# Patient Record
Sex: MALE | Race: WHITE
[De-identification: names, ages, dates, MRNs, and addresses within clinical notes are randomized per-mention and may not be internally consistent; named-entity substitution may affect disease eponyms.]

---

## 2018-02-15 ENCOUNTER — HOSPITAL ENCOUNTER (OUTPATIENT)
Dept: HOSPITAL 35 - PAIN | Age: 48
End: 2018-02-15
Attending: ANESTHESIOLOGY
Payer: COMMERCIAL

## 2018-02-15 VITALS — BODY MASS INDEX: 28.7 KG/M2 | WEIGHT: 205 LBS | HEIGHT: 70.98 IN

## 2018-02-15 VITALS — SYSTOLIC BLOOD PRESSURE: 160 MMHG | DIASTOLIC BLOOD PRESSURE: 100 MMHG

## 2018-02-15 DIAGNOSIS — F39: ICD-10-CM

## 2018-02-15 DIAGNOSIS — G89.29: Primary | ICD-10-CM

## 2018-02-15 DIAGNOSIS — Z79.891: ICD-10-CM

## 2018-02-15 DIAGNOSIS — R07.89: ICD-10-CM

## 2018-05-07 ENCOUNTER — HOSPITAL ENCOUNTER (OUTPATIENT)
Dept: HOSPITAL 35 - PAIN | Age: 48
End: 2018-05-07
Attending: ANESTHESIOLOGY
Payer: COMMERCIAL

## 2018-05-07 VITALS — SYSTOLIC BLOOD PRESSURE: 152 MMHG | DIASTOLIC BLOOD PRESSURE: 94 MMHG

## 2018-05-07 VITALS — WEIGHT: 207.2 LBS | HEIGHT: 70.98 IN | BODY MASS INDEX: 29.01 KG/M2

## 2018-05-07 DIAGNOSIS — F11.90: ICD-10-CM

## 2018-05-07 DIAGNOSIS — G89.22: ICD-10-CM

## 2018-05-07 DIAGNOSIS — R07.89: Primary | ICD-10-CM

## 2018-07-23 ENCOUNTER — HOSPITAL ENCOUNTER (OUTPATIENT)
Dept: HOSPITAL 35 - PAIN | Age: 48
End: 2018-07-23
Attending: ANESTHESIOLOGY
Payer: COMMERCIAL

## 2018-07-23 VITALS — DIASTOLIC BLOOD PRESSURE: 96 MMHG | SYSTOLIC BLOOD PRESSURE: 158 MMHG

## 2018-07-23 VITALS — WEIGHT: 210 LBS | BODY MASS INDEX: 29.4 KG/M2 | HEIGHT: 70.98 IN

## 2018-07-23 DIAGNOSIS — G89.4: ICD-10-CM

## 2018-07-23 DIAGNOSIS — F32.9: ICD-10-CM

## 2018-07-23 DIAGNOSIS — Z79.891: ICD-10-CM

## 2018-07-23 DIAGNOSIS — R07.9: ICD-10-CM

## 2018-07-23 DIAGNOSIS — F41.9: ICD-10-CM

## 2018-07-23 DIAGNOSIS — G47.00: Primary | ICD-10-CM

## 2018-10-15 ENCOUNTER — HOSPITAL ENCOUNTER (OUTPATIENT)
Dept: HOSPITAL 35 - PAIN | Age: 48
End: 2018-10-15
Attending: ANESTHESIOLOGY
Payer: COMMERCIAL

## 2018-10-15 VITALS — DIASTOLIC BLOOD PRESSURE: 88 MMHG | SYSTOLIC BLOOD PRESSURE: 136 MMHG

## 2018-10-15 VITALS — BODY MASS INDEX: 29.26 KG/M2 | WEIGHT: 209 LBS | HEIGHT: 70.98 IN

## 2018-10-15 DIAGNOSIS — M17.11: ICD-10-CM

## 2018-10-15 DIAGNOSIS — F32.9: ICD-10-CM

## 2018-10-15 DIAGNOSIS — F41.9: ICD-10-CM

## 2018-10-15 DIAGNOSIS — G89.29: ICD-10-CM

## 2018-10-15 DIAGNOSIS — M54.5: Primary | ICD-10-CM

## 2018-10-15 DIAGNOSIS — M96.1: ICD-10-CM

## 2018-10-15 DIAGNOSIS — R27.0: ICD-10-CM

## 2018-10-15 DIAGNOSIS — E83.119: ICD-10-CM

## 2018-10-15 DIAGNOSIS — Z79.899: ICD-10-CM

## 2019-01-07 ENCOUNTER — HOSPITAL ENCOUNTER (OUTPATIENT)
Dept: HOSPITAL 35 - PAIN | Age: 49
End: 2019-01-07
Attending: CLINICAL NURSE SPECIALIST
Payer: COMMERCIAL

## 2019-01-07 VITALS — DIASTOLIC BLOOD PRESSURE: 109 MMHG | SYSTOLIC BLOOD PRESSURE: 157 MMHG

## 2019-01-07 VITALS — HEIGHT: 70.98 IN | BODY MASS INDEX: 29.71 KG/M2 | WEIGHT: 212.2 LBS

## 2019-01-07 DIAGNOSIS — M96.1: ICD-10-CM

## 2019-01-07 DIAGNOSIS — M19.90: ICD-10-CM

## 2019-01-07 DIAGNOSIS — F32.9: ICD-10-CM

## 2019-01-07 DIAGNOSIS — Z79.899: ICD-10-CM

## 2019-01-07 DIAGNOSIS — F41.9: ICD-10-CM

## 2019-01-07 DIAGNOSIS — G89.29: Primary | ICD-10-CM

## 2019-01-07 DIAGNOSIS — E83.119: ICD-10-CM

## 2019-01-07 NOTE — NUR
Pain Clinic Assessment:
 
1. History of Osteoarthritis:
Not Applicable
   History of Rheumatoid Arthritis:
Not Applicable
 
2. Height: 5 ft. 11 in. 180.3 cm.
   Weight: 212.2 lb.  oz. 96.253 kg.
   Patient's BMI: 29.6
 
3. Vital Signs:
   BP: 157/109 Pulse: 103 Resp: 18
   Temp:  02 Sat: 100 ECG Mon:
 
4. Pain Intensity: 4
 
5. Fall Risk:
   Dizziness: N  Needs help standing or walking: N
   Fallen in the last 3 months: N
   Fall risk comments:
 
 
6. Patient on Blood Thinner: None
 
7. History of Hypertension: N
 
8. Opioid Therapy greater than 6 weeks: Y
   Opiate Contract Signed: 08/04/16
 
9. Risk Assessment Tool Provided: LOW RISK 0/3
 
10. Functional Assessment Tool: 19/70
 
11. Recreational Drug Use: Never Drug Type:
    Tobacco Use: Never Smoker Tobacco Type:
       Amount or Packs/day:  How Many Years:
    Alcohol Use: Yes  Frequency: Special Occasions Quant:

## 2019-01-08 NOTE — HPC
[facility]
[address]
[place]   [address]                     PAIN MANAGEMENT CONSULTATION  
_______________________________________________________________________________
 
Name:       DAVID MARTINEZIAN               [facility] #:                     [facility]      [id number]                       Account #:      [id number]  
Admission:  01/07/19    Attend Phys:    Wanda Angel     
Discharge:              Date of Birth:  [birth year]  
                                                          Report #: [id number]
                                                                    [id number]   
_______________________________________________________________________________
THIS REPORT FOR:   //name//                          
 
CC: Wanda Angel
    [facility] physician/PCP
 
DATE OF SERVICE:  01/07/2019
 
 
CHIEF COMPLAINT:  Chronic pain, cervicalgia, osteoarthritis, lower back pain and
right chest wall pain.
 
HISTORY OF CHIEF COMPLAINT:  The patient returns to the pain clinic today for
refill of his medication.  He is a longstanding patient that requires opioids on
a daily basis.  He tells me that his pain score is 4/10 today, which is an
average, but occasionally, it will be 7-8/10.  He denies any constipation.  He
tells me that he does take fruits and vegetables, especially strawberries and
drinks plenty of water every day.  He denies any daytime somnolence.  He says
that he recently had an implant of his tooth that has been ongoing since
November and he has been having some ringing in his ear.  He is to have the
final tooth placed in February.  He tells me that it is slowly getting better. 
Unsure if it is from his implant or if it is from an accumulation of wax, but he
has a history of accumulating in his ears.  The patient tells me he will try to
deal with that after his implant in February completed.  The patient tells me
that the medicines that he is prescribed is Exalgo, it helps his right rib area
and his lower back pain and he does have pain, worse with activity.  He has
recently tried to return to golf and tells me that his chest felt some pulling
and has had increased pain in that area since playing golf, but his goal for the
year is to play 18 rounds of golf frequently, try to work through his pain and
get back to things that he enjoys in life.  The patient would like a refill of
his medications today.
 
ALLERGIES:  PENICILLIN.
 
MEDICATIONS:  Current list of medication is hydromorphone, Exalgo 12 mg every
day.
 
PQRS:
1.  He has diffuse generalized osteoarthritis involving multiple small and large
joints.  Denies rheumatoid arthritis.
2.  Height is 5 feet 11 inches, weight is 212, BMI is 29.6.
3.  Blood pressure 157/109, pulse is 103, respirations 18, oxygen sat is 100%.
4.  Pain score is 4/10.
5.  Fall risk.  Denies dizziness, does not need help walking or standing.  Has
not fallen in the last 3 months.
6.  The patient is not on any blood thinners and not on any hypertension
medicines.
 
 
 
[address]   [address]                     PAIN MANAGEMENT CONSULTATION  
_______________________________________________________________________________
 
Name:       DAVID MARTINEZ               Room #:                     REG [facility]#:      [id number]                       Account #:      [id number]  
Admission:  01/07/19    Attend Phys:    Wanda nAgel     
Discharge:              Date of Birth:  [birth year]  
                                                          Report #: [id number]
                                                                    [id number]   
_______________________________________________________________________________
7.  Opioid therapy is greater than 6 weeks, therefore, an opioid signed contract
is on the chart.  The patient's risk assessment tool is low.  His functional
assessment is 19/70.  The patient denies recreational drug use.  Does not smoke
and occasionally drinks alcohol.  We have checked the prescription monitoring
systems for this patient.  He is filling appropriately from Dr. Hunter Cummings
in a timely fashion.  The patient tells me he denied any medications that were
offered to him from his oral surgeon for the implants because he has a contract
with us.  The patient's drug screen will be repeated at next visit, he had one
done at the end of last year.
 
PHYSICAL EXAMINATION:
GENERAL:  This is a well-developed, well-nourished, well-hydrated gentleman that
appears his stated age.  He is alert and orientated.  His affect is appropriate.
HEENT:  Normocephalic, atraumatic.  Extraocular eye muscles are intact.  Mucous
membranes are moist.  Hearing is adequate.  Complains of some ringing in his
right ear today.  Does have 1 missing tooth in the front in his right side and
will have implanted before next visit.
NECK:  Without JVD or adenopathy.  Good range of motion.
MUSCULOSKELETAL:  The patient is able to move from sitting to standing position
without difficulty.  He walks with some antalgic gait.  Does complain of some
right chest wall pain.
 
IMPRESSION:
1.  Chronic low back pain with multiple pain generators, post laminectomy
syndrome.
2.  Management of high risk medications.
3.  Osteoarthritis, multiple joints.
4.  History of hemochromatosis with phlebotomies bi-monthly.
5.  History of depression and anxiety.
 
We reviewed the fact that opiate medications are being used to provide analgesia
adequate to support activities of daily living, not attempting to achieve a
specific pain score on the 0-10 Visual Analog Scale.  The current opiate
medications are providing sufficient analgesia to allow the patient to
participate in activities of daily living.  The patient is not exhibiting any
aberrant behavior suggestive of drug diversion.  The patient is not having any
adverse reactions to medications.  The patient is not suffering from daytime
somnolence or mental acuity changes.  The patient is managing opiate-induced
constipation with appropriate over-the-counter agents and dietary
considerations.  The patient was counseled on concern for caution with operating
a motor vehicle while using opiate medications.
 
A physical exam was performed and the patient's functional status was evaluated.
 All patients with back pain were advised against the bed rest greater than 4
days and were advised to return to normal activities.  Pain score assessment was
noted and the treatment plan was reviewed with the patient.  All current
 
 
 
[facility]
[address] [place]   [address]                     PAIN MANAGEMENT CONSULTATION  
_______________________________________________________________________________
 
Name:       DAVID MARTINEZ               Room #:                     REG [facility] 
M.R.#:      [id number]                       Account #:      [id number]  
Admission:  01/07/19    Attend Phys:    Wanda Angel     
Discharge:              Date of Birth:  [birth year]  
                                                          Report #: [id number]
                                                                    [id number]   
_______________________________________________________________________________
medications, both prescribed and OTC were reviewed and reconciled on the
electronic medical record.  Tobacco screening was accomplished and smoking
cessation was advised when indicated.  BMI was noted and diet/exercise
modification was recommended for all patients following outside normal
parameters.
 
I reviewed with the patient today their responsibilities to safeguard
prescription medications, reviewed their responsibility to utilize medications
only as prescribed by the physician.  They are to seek and receive pain
medications only from 1 physician group ( [facility]).  They are to use 1
pharmacy and keep the clinic informed if they change pharmacies.  Their
responsibilities include making followup visits in a timely fashion and to avoid
abrupt discontinuation of medication usage.  Their responsibilities further
include bringing their medications (bottles from the pharmacy with residual
pills) to the visit for possible confirmation of pill counts and the patient
understands it is their responsibility to submit to random drug screens to
ensure both that the medications prescribed are present, and that no other
controlled substances are present.  All prescriptions provided today were
generated electronically.
 
PLAN:
1.  The patient returned to the pain clinic today for followup for his pain
medications.  We discussed treatment options for possible ringing of his ear. 
He will wait to see his primary care doctor after his implant in his tooth in
early February.  If it continues, he will see his primary regarding possible
removal of wax in his ears.  We did briefly discuss some medication management,
but will hold off on that until after these other things are done.  The patient
would like not to take any medications if possible.
2.  Exalgo 12 mg given for today, 4 week and  8 weeks #30 pills.
3.  The patient was seen also by Dr. Cummings, but in collaboration today with
him.  The patient will be seen in 3 months' time.
 
 
 
 
 
 
 
 
 
 
 
 
 
  <ELECTRONICALLY SIGNED>
   By: Wanda Angel             
  01/08/19     0814
D: 01/07/19 1122                           _____________________________________
T: 01/07/19 1738                           Wanda Angel               /nt

## 2019-03-28 ENCOUNTER — HOSPITAL ENCOUNTER (OUTPATIENT)
Dept: HOSPITAL 35 - PAIN | Age: 49
End: 2019-03-28
Attending: CLINICAL NURSE SPECIALIST
Payer: COMMERCIAL

## 2019-03-28 VITALS — BODY MASS INDEX: 27.77 KG/M2 | HEIGHT: 72.01 IN | WEIGHT: 205 LBS

## 2019-03-28 VITALS — SYSTOLIC BLOOD PRESSURE: 154 MMHG | DIASTOLIC BLOOD PRESSURE: 98 MMHG

## 2019-03-28 DIAGNOSIS — F32.9: ICD-10-CM

## 2019-03-28 DIAGNOSIS — M19.90: Primary | ICD-10-CM

## 2019-03-28 DIAGNOSIS — E83.119: ICD-10-CM

## 2019-03-28 DIAGNOSIS — G89.29: ICD-10-CM

## 2019-03-28 DIAGNOSIS — M96.1: ICD-10-CM

## 2019-03-28 DIAGNOSIS — M54.5: ICD-10-CM

## 2019-03-28 DIAGNOSIS — Z79.899: ICD-10-CM

## 2019-03-28 DIAGNOSIS — F41.9: ICD-10-CM

## 2019-03-28 DIAGNOSIS — Z79.891: ICD-10-CM

## 2019-03-28 NOTE — NUR
Pain Clinic Assessment:
 
1. History of Osteoarthritis:
Not Applicable
   History of Rheumatoid Arthritis:
Not Applicable
 
2. Height: 6 ft. 0 in. 182.9 cm.
   Weight: 205.0 lb.  oz. 92.988 kg.
   Patient's BMI: 27.8
 
3. Vital Signs:
   BP: 154/98 Pulse: 97 Resp: 16
   Temp:  02 Sat: 97 ECG Mon:
 
4. Pain Intensity: 4
 
5. Fall Risk:
   Dizziness: N  Needs help standing or walking: N
   Fallen in the last 3 months: N
   Fall risk comments:
 
 
6. Patient on Blood Thinner: None
 
7. History of Hypertension: N
 
8. Opioid Therapy greater than 6 weeks: Y
   Opiate Contract Signed: 08/04/16
 
9. Risk Assessment Tool Provided: LOW RISK 0/3
 
10. Functional Assessment Tool: 19/70
 
11. Recreational Drug Use: Never Drug Type:
    Tobacco Use: Never Smoker Tobacco Type:
       Amount or Packs/day:  How Many Years:
    Alcohol Use: Yes  Frequency: Weekly Quant: 6-8 beers

## 2019-06-17 ENCOUNTER — HOSPITAL ENCOUNTER (OUTPATIENT)
Dept: HOSPITAL 35 - PAIN | Age: 49
End: 2019-06-17
Attending: CLINICAL NURSE SPECIALIST
Payer: COMMERCIAL

## 2019-06-17 VITALS — DIASTOLIC BLOOD PRESSURE: 95 MMHG | SYSTOLIC BLOOD PRESSURE: 148 MMHG

## 2019-06-17 VITALS — HEIGHT: 72.01 IN | BODY MASS INDEX: 29.2 KG/M2 | WEIGHT: 215.6 LBS

## 2019-06-17 DIAGNOSIS — M96.1: ICD-10-CM

## 2019-06-17 DIAGNOSIS — Z79.891: ICD-10-CM

## 2019-06-17 DIAGNOSIS — M19.90: Primary | ICD-10-CM

## 2019-06-17 DIAGNOSIS — G89.29: ICD-10-CM

## 2019-06-17 DIAGNOSIS — Z86.2: ICD-10-CM

## 2019-06-17 NOTE — NUR
Pain Clinic Assessment:
 
1. History of Osteoarthritis:
Not Applicable
   History of Rheumatoid Arthritis:
Not Applicable
 
2. Height: 6 ft. 0 in. 182.9 cm.
   Weight: 215.6 lb.  oz. 97.796 kg.
   Patient's BMI: 29.2
 
3. Vital Signs:
   BP: 148/95 Pulse: 98 Resp: 16
   Temp:  02 Sat: 100 ECG Mon:
 
4. Pain Intensity: 6
 
5. Fall Risk:
   Dizziness: N  Needs help standing or walking: N
   Fallen in the last 3 months: N
   Fall risk comments:
 
 
6. Patient on Blood Thinner: None
 
7. History of Hypertension: N
 
8. Opioid Therapy greater than 6 weeks: Y
   Opiate Contract Signed: 08/04/16
 
9. Risk Assessment Tool Provided: LOW RISK 0/3
 
10. Functional Assessment Tool: 19/70
 
11. Recreational Drug Use: Never Drug Type:
    Tobacco Use: Never Smoker Tobacco Type:
       Amount or Packs/day:  How Many Years:
    Alcohol Use: Yes  Frequency:  Quant:

## 2019-09-09 ENCOUNTER — HOSPITAL ENCOUNTER (OUTPATIENT)
Dept: HOSPITAL 35 - PAIN | Age: 49
End: 2019-09-09
Attending: CLINICAL NURSE SPECIALIST
Payer: COMMERCIAL

## 2019-09-09 VITALS — WEIGHT: 208.8 LBS | BODY MASS INDEX: 28.28 KG/M2 | HEIGHT: 72.01 IN

## 2019-09-09 VITALS — DIASTOLIC BLOOD PRESSURE: 90 MMHG | SYSTOLIC BLOOD PRESSURE: 146 MMHG

## 2019-09-09 DIAGNOSIS — M96.1: ICD-10-CM

## 2019-09-09 DIAGNOSIS — Z88.0: ICD-10-CM

## 2019-09-09 DIAGNOSIS — Z79.891: ICD-10-CM

## 2019-09-09 DIAGNOSIS — M54.5: Primary | ICD-10-CM

## 2019-09-09 DIAGNOSIS — E83.119: ICD-10-CM

## 2019-09-09 DIAGNOSIS — M19.90: ICD-10-CM

## 2019-09-09 DIAGNOSIS — G89.29: ICD-10-CM

## 2019-09-09 DIAGNOSIS — Z79.899: ICD-10-CM

## 2019-09-09 NOTE — NUR
Pain Clinic Assessment:
 
1. History of Osteoarthritis:
Not Applicable
   History of Rheumatoid Arthritis:
Not Applicable
 
2. Height: 6 ft. 0 in. 182.9 cm.
   Weight: 208.8 lb.  oz. 94.711 kg.
   Patient's BMI: 28.3
 
3. Vital Signs:
   BP: 146/90 Pulse: 85 Resp: 16
   Temp:  02 Sat: 99 ECG Mon:
 
4. Pain Intensity: 4
 
5. Fall Risk:
   Dizziness: N  Needs help standing or walking: N
   Fallen in the last 3 months: N
   Fall risk comments:
 
 
6. Patient on Blood Thinner: None
 
7. History of Hypertension: N
 
8. Opioid Therapy greater than 6 weeks: Y
   Opiate Contract Signed: 08/04/16
 
9. Risk Assessment Tool Provided: LOW RISK 0/3
 
10. Functional Assessment Tool: 36/70
 
11. Recreational Drug Use: Never Drug Type:
    Tobacco Use: Never Smoker Tobacco Type:
       Amount or Packs/day:  How Many Years:
    Alcohol Use: Yes  Frequency: Weekly Quant: 1-2

## 2019-12-02 ENCOUNTER — HOSPITAL ENCOUNTER (OUTPATIENT)
Dept: HOSPITAL 35 - PAIN | Age: 49
End: 2019-12-02
Attending: CLINICAL NURSE SPECIALIST
Payer: COMMERCIAL

## 2019-12-02 VITALS — SYSTOLIC BLOOD PRESSURE: 152 MMHG | DIASTOLIC BLOOD PRESSURE: 88 MMHG

## 2019-12-02 VITALS — BODY MASS INDEX: 29.99 KG/M2 | WEIGHT: 214.2 LBS | HEIGHT: 70.98 IN

## 2019-12-02 DIAGNOSIS — R07.89: ICD-10-CM

## 2019-12-02 DIAGNOSIS — Z86.2: ICD-10-CM

## 2019-12-02 DIAGNOSIS — M15.9: ICD-10-CM

## 2019-12-02 DIAGNOSIS — Z79.899: ICD-10-CM

## 2019-12-02 DIAGNOSIS — Z88.0: ICD-10-CM

## 2019-12-02 DIAGNOSIS — M96.1: Primary | ICD-10-CM

## 2019-12-02 NOTE — NUR
Pain Clinic Assessment:
 
1. History of Osteoarthritis:
Not Applicable
   History of Rheumatoid Arthritis:
Not Applicable
 
2. Height: 5 ft. 11 in. 180.3 cm.
   Weight: 214.2 lb.  oz. 97.161 kg.
   Patient's BMI: 29.9
 
3. Vital Signs:
   BP: 152/88 Pulse: 93 Resp: 16
   Temp:  02 Sat: 100 ECG Mon:
 
4. Pain Intensity: 6
 
5. Fall Risk:
   Dizziness: N  Needs help standing or walking: N
   Fallen in the last 3 months: N
   Fall risk comments:
 
 
6. Patient on Blood Thinner: None
 
7. History of Hypertension: N
 
8. Opioid Therapy greater than 6 weeks: Y
   Opiate Contract Signed: 08/04/16
 
9. Risk Assessment Tool Provided: LOW RISK 0/3
 
10. Functional Assessment Tool: 36/70
 
11. Recreational Drug Use: Never Drug Type:
    Tobacco Use: Never Smoker Tobacco Type:
       Amount or Packs/day:  How Many Years:
    Alcohol Use: Yes  Frequency: Weekly Quant: 12

## 2019-12-03 NOTE — HPC
[facility]
[address]
[place]   [address]                     PAIN MANAGEMENT CONSULTATION  
_______________________________________________________________________________
 
Name:       DAVID MARTINEZ               Room #:                     REG [facility]..#:      [id number]                       Account #:      [id number]  
Admission:  12/02/19    Attend Phys:    Wanda Angel     
Discharge:              Date of Birth:  [birth year]  
                                                          Report #: [id number]
                                                                    [id number]   
_______________________________________________________________________________
THIS REPORT FOR:   //name//                          
 
CC: Wanda Angel
    [facility] physician/PCP
    Hunter Cummings MD
 
DATE OF SERVICE:  12/02/2019
 
 
CHIEF COMPLAINT:  Low back pain, right chest wall pain.
 
HISTORY OF PRESENT ILLNESS:  This is a very pleasant 49-year-old gentleman who
returns to the pain clinic today for refill of his medications that he uses to
help treat his right rib pain that radiates to his thoracic back as well as
lumbar back pain.  He reports a pain score of 6/10 today.  It is a nagging,
aching, throbbing, burning pain that is worse with activity and changes in the
weather.  He feels like his medication are very beneficial in controlling his
pain as well as rest.  He continues to be active with his job.  He will be
traveling out of town in the next few months and is here today to discuss that
wondering if his wife will be able to  his January prescription from the
pharmacy and mail it to him.  He reports no problems with constipation or
daytime sleepiness from his medications.
 
ALLERGIES:  PENICILLIN.
 
CURRENT LIST OF MEDICATIONS:  Exalgo 12 mg daily and ibuprofen p.r.n.
 
PQRS:
1.  He has diffuse osteoarthritis involving multiple joints, but denies any
rheumatoid arthritis.
2.  Height is 5 feet 11 inches, weight is 214, BMI is 29.
3.  Vital signs 158/88, pulse is 93, respirations 16, oxygen sat is 100.
4.  Pain score is 6/10.
5.  Denies dizziness, does not need help walking or standing, has not fallen in
the last 3 months.
6.  The patient is not on any blood thinners or medicine for hypertension.
7.  Opioid therapy is greater than 6 weeks; therefore, an opioid signed contract
is on the chart.  His risk assessment tool is low.  Functional assessment is
36/70.
8.  Recreational drug use, he denies.  He is not a smoker and occasionally
drinks alcohol.
 
According to the prescription monitoring system, the patient is filling
appropriately for his medications in a timely fashion from Dr. Hunter Cummings
only.  We will check a random drug screen on him at his next visit.  He does
report safeguarding his meds at all times.
 
 
 
[address]   [address]                     PAIN MANAGEMENT CONSULTATION  
_______________________________________________________________________________
 
Name:       DAVID MARTINEZ               Room #:                     REG CLI 
PEDRO#:      [id number]                       Account #:      [id number]  
Admission:  12/02/19    Attend Phys:    Wanda Angel     
Discharge:              Date of Birth:  [birth year]  
                                                          Report #: [id number]
                                                                    [id number]   
_______________________________________________________________________________
 
PHYSICAL EXAMINATION:
GENERAL:  This is alert and orientated 49-year-old gentleman who appears his
stated age, placing his current pain score at 6/10 today.  He is a
well-developed, well-nourished gentleman.
HEENT:  Normocephalic, atraumatic.  Extraocular eye muscles are intact.  Mucous
membranes are moist.
MUSCULOSKELETAL:  Tenderness in his lower lumbar region along the paraspinal
muscles.  He walks with a slightly antalgic gait.  He has right chest wall pain
that radiates from his front of his right ribs around to his thoracic spine that
is throbbing, burning.  Patient's lower extremity strength judged to be 5/5 in
all major muscle groups.
 
IMPRESSION:
1.  Chronic low back pain with multiple pain generators.
2.  Post laminectomy syndrome.
3.  Management of high risk medications under terms of written opioid agreement.
4.  Osteoarthritis involving multiple joints.
5.  History of hemochromatosis with phlebotomies.
6.  Chronic intractable chest wall pain.
7.  Post-thoracotomy syndrome, status post pleurodesis.
 
We reviewed the fact that opiate medications are being used to provide analgesia
adequate to support activities of daily living, not attempting to achieve a
specific pain score on the 0-10 Visual Analog Scale.  The current opiate
medications are providing sufficient analgesia to allow the patient to
participate in activities of daily living.  The patient is not exhibiting any
aberrant behavior suggestive of drug diversion.  The patient is not having any
adverse reactions to medications.  The patient is not suffering from daytime
somnolence or mental acuity changes.  The patient is managing opiate-induced
constipation with appropriate over-the-counter agents and dietary
considerations.  The patient was counseled on concern for caution with operating
a motor vehicle while using opiate medications.
 
PLAN:
1.  We discussed treatment options with the patient today.  The patient finds
his medications very beneficial in controlling his pain.  He is able to function
quite well and work full time.  He will be traveling to [place] for the next few
months.  He will be able to  his prescriptions today, but he is worried
about a January fill.  We explained to him that his wife will be able to 
prescriptions with her photo ID.  Encouraged him to mail them by [facility] with
signature required.  He verbalizes understanding.
2.  We will have Dr. Hunter Cummings e-sign his hydromorphone 12 mg, #30, for 3
months to the [facility].  This places him at 48 morphine mEq a day 
 
 
 
[address]   [address]                     PAIN MANAGEMENT CONSULTATION  
_______________________________________________________________________________
 
Name:       DAVID MARTINEZ               Room #:                     REG CLI 
PEDRO#:      [id number]                       Account #:      [id number]  
Admission:  12/02/19    Attend Phys:    Wanda Angel     
Discharge:              Date of Birth:  [birth year]  
                                                          Report #: [id number]
                                                                    [id number]   
_______________________________________________________________________________
according to the CDC guidelines.
3.  The patient is seen in collaboration with Dr. Hunter mcduffie.
 
 
 
 
 
 
 
 
 
 
 
 
 
 
 
 
 
 
 
 
 
 
 
 
 
 
 
 
 
 
 
 
 
 
 
 
 
 
 
 
 
 
  <ELECTRONICALLY SIGNED>
   By: Wanda Angel             
  12/03/19     1513
D: 12/02/19 1128                           _____________________________________
T: 12/02/19 1528                           Wanda Angel               /nt

## 2020-02-24 ENCOUNTER — HOSPITAL ENCOUNTER (OUTPATIENT)
Dept: HOSPITAL 35 - PAIN | Age: 50
End: 2020-02-24
Attending: CLINICAL NURSE SPECIALIST
Payer: COMMERCIAL

## 2020-02-24 VITALS — SYSTOLIC BLOOD PRESSURE: 151 MMHG | DIASTOLIC BLOOD PRESSURE: 99 MMHG

## 2020-02-24 VITALS — HEIGHT: 70.98 IN | BODY MASS INDEX: 29.88 KG/M2 | WEIGHT: 213.4 LBS

## 2020-02-24 DIAGNOSIS — M54.5: Primary | ICD-10-CM

## 2020-02-24 DIAGNOSIS — M19.90: ICD-10-CM

## 2020-02-24 DIAGNOSIS — M96.1: ICD-10-CM

## 2020-02-24 DIAGNOSIS — G89.4: ICD-10-CM

## 2020-02-24 DIAGNOSIS — Z79.899: ICD-10-CM

## 2020-02-24 NOTE — NUR
Pain Clinic Assessment:
 
1. History of Osteoarthritis:
Not Applicable
   History of Rheumatoid Arthritis:
Not Applicable
 
2. Height: 5 ft. 11 in. 180.3 cm.
   Weight: 213.4 lb.  oz. 96.798 kg.
   Patient's BMI: 29.8
 
3. Vital Signs:
   BP: 151/99 Pulse: 87 Resp: 14
   Temp:  02 Sat: 97 ECG Mon:
 
4. Pain Intensity: 5
 
5. Fall Risk:
   Dizziness: N  Needs help standing or walking: N
   Fallen in the last 3 months: N
   Fall risk comments:
 
 
6. Patient on Blood Thinner: None
 
7. History of Hypertension: N
 
8. Opioid Therapy greater than 6 weeks: Y
   Opiate Contract Signed: 08/04/16
 
9. Risk Assessment Tool Provided: LOW RISK 0/3
 
10. Functional Assessment Tool: 36/70
 
11. Recreational Drug Use: Never Drug Type:
    Tobacco Use: Never Smoker Tobacco Type:
       Amount or Packs/day:  How Many Years:
    Alcohol Use: Yes  Frequency:  Quant:

## 2020-02-25 NOTE — HPC
[facility]
[address]
[place]   [address]                     PAIN MANAGEMENT CONSULTATION  
_______________________________________________________________________________
 
Name:       DAVID MARTINEZIAN               [facility]                     [facility]      [id number]                       Account #:      [id number]  
Admission:  02/24/20    Attend Phys:    Wanda Angel     
Discharge:              Date of Birth:  [birth year]  
                                                          Report #: [id number]
                                                                    [id number]   
_______________________________________________________________________________
THIS REPORT FOR:  
 
cc:  ANDER Main family physician/PCP 
     ANDER Main family physician/PCP                                        
     Wanda Angel                                            ~
THIS REPORT FOR:   //name//                          
 
CC: Wanda Angel
    [facility] physician/PCP
 
DATE OF SERVICE:  02/24/2020
 
 
CHIEF COMPLAINT:  Chronic low back pain, right chest wall pain.
 
HISTORY OF PRESENT ILLNESS:  This is a very pleasant 49-year-old gentleman who
returns to the pain clinic today for refill of his Exalgo for his treatment of
his right rib area that radiates from his frontal chest wall to the back
following surgery.  It is a nagging, aching, throbbing, burning pain.  He
reports a pain score of 5/10.  He feels that the weather changes and too much
activity do make it worse and somebody touches that area.  He feels the
medications are very beneficial.  He likes taking one tablet of pain medicine a
day.  He occasionally will experience slight fogginess in his head.  Otherwise,
he feels that he mostly has no side effects from the medication.  Occasionally,
he reports he also has some constipation issues.  He takes fiber and drinks
plenty of liquids to combat this.
 
ALLERGIES:  PENICILLIN.
 
CURRENT LIST OF MEDICATIONS:  Hydromorphone ER 12 mg tablets daily and Motrin
p.r.n.
 
PQRS:
1.  He has diffuse osteoarthritis involving multiple joints and denies any
rheumatoid arthritis.
2.  Height is 5 feet 11 inches, weight is 213, BMI is 29.
3.  Vital signs 151/99, pulse is 87, respirations 14, oxygen sat is 97.
4.  Pain score is 5/10.
5.  Denies dizziness, does not need help walking or standing, has not fallen in
the last 3 months.
6.  The patient is not on any blood thinners or medicines for hypertension.
7.  Opiate therapy is greater than 6 weeks; therefore, an opioid signed contract
is on the chart.  Risk assessment tool is low.  Functional assessment is 36/70.
8.  Recreational drug use, he denies.  He chews tobacco products and
occasionally drinks alcohol.
 
 
 
 
[address]   [address]                     PAIN MANAGEMENT CONSULTATION  
_______________________________________________________________________________
 
Name:       MARTINEZDAVID REY               [facility] #:                     [facility]      [id number]                       Account #:      [id number]  
Admission:  02/24/20    Attend Phys:    Wanda LUCAS Angel     
Discharge:              Date of Birth:  [birth year]  
                                                          Report #: [id number]
                                                                    [id number]   
_______________________________________________________________________________
According to the prescription monitoring system, the patient is filling
appropriately for his medications, filling them in a timely fashion.  He is due
to fill his medications this week.  His morphine mEq according to the CDC
guidelines is 48 per day.  We will collect a random drug screen on this patient
today.
 
PHYSICAL EXAMINATION:
GENERAL:  This is alert and orientated 49-year-old gentleman who appears his
stated age.  He is a good historian, placing his current pain score at 5/10
today.
HEENT:  Normocephalic, atraumatic.  Extraocular eye muscles are intact.  Mucous
membranes are moist.
MUSCULOSKELETAL:  He has pain in his right chest wall that radiates from the
front of his ribs around his thoracic spine and is a throbbing, burning pain. 
He also has pain in his lower lumbar region in the paraspinal musculature. 
Lower extremity strength judged to be 5/5 in all major muscle groups.
 
IMPRESSION:
1.  Chronic low back pain with multiple pain generators.
2.  Post-laminectomy syndrome.
3.  Management of high risk medications under terms of written opioid agreement.
4.  Post-thoracotomy syndrome, status post pleurodesis.
5.  Osteoarthritis involving multiple joints.
6.  History of hemochromatosis with phlebotomies.
7.  Chronic intractable chest wall pain.
 
We reviewed the fact that opiate medications are being used to provide analgesia
adequate to support activities of daily living, not attempting to achieve a
specific pain score on the 0-10 Visual Analog Scale.  The current opiate
medications are providing sufficient analgesia to allow the patient to
participate in activities of daily living.  The patient is not exhibiting any
aberrant behavior suggestive of drug diversion.  The patient is not having any
adverse reactions to medications.  The patient is not suffering from daytime
somnolence or mental acuity changes.  The patient is managing opiate-induced
constipation with appropriate over-the-counter agents and dietary
considerations.  The patient was counseled on concern for caution with operating
a motor vehicle while using opiate medications.
 
A physical exam was performed and the patient's functional status was evaluated.
 All patients with back pain were advised against the bed rest greater than 4
days and were advised to return to normal activities.  Pain score assessment was
noted and the treatment plan was reviewed with the patient.  All current
medications, both prescribed and OTC were reviewed and reconciled on the
electronic medical record.  Tobacco screening was accomplished and smoking
cessation was advised when indicated.  BMI was noted and diet/exercise
modification was recommended for all patients following outside normal
 
 
 
[facility]
[address] [place]   [address]                     PAIN MANAGEMENT CONSULTATION  
_______________________________________________________________________________
 
Name:       DAVID MARTINEZ               Room #:                     REG DOR[facility] VASQUEZ#:      [id number]                       Account #:      [id number]  
Admission:  02/24/20    Attend Phys:    Wanda Angel     
Discharge:              Date of Birth:  [birth year]  
                                                          Report #: [id number]
                                                                    [id number]   
_______________________________________________________________________________
parameters.
 
I reviewed with the patient today their responsibilities to safeguard
prescription medications, reviewed their responsibility to utilize medications
only as prescribed by the physician.  They are to seek and receive pain
medications only from 1 physician group ( [facility]).  They are to use 1
pharmacy and keep the clinic informed if they change pharmacies.  Their
responsibilities include making followup visits in a timely fashion and to avoid
abrupt discontinuation of medication usage.  Their responsibilities further
include bringing their medications (bottles from the pharmacy with residual
pills) to the visit for possible confirmation of pill counts and the patient
understands it is their responsibility to submit to random drug screens to
ensure both that the medications prescribed are present, and that no other
controlled substances are present.  All prescriptions provided today were
generated electronically.
 
 
PLAN:
1.  We discussed treatment options with the patient today.  The patient finds
his Exalgo very beneficial in controlling his pain.  He is taking one 12 mg
tablet daily.  Dr. Hunter Cummings will e-sign this  medication to his [facility] for 3 months of medication.
2.  We did talk about an option of tanezumab injection, which is coming in the
market.  It is an injectable that helps deal with his pain.  The patient is open
to talking about this medication when it does become available.  He would like
to not have to take daily pain pill and not think about trying to remember
taking it on a daily basis.
3.  We will collect a urine drug screen on this patient since it has been
greater than one year.
4.  Dr. Hunter Cummings did see the patient today and collaborated care.
 
 
 
 
 
 
 
 
 
 
 
 
 
 
  <ELECTRONICALLY SIGNED>
   By: Wanda Angel             
  02/25/20     1325
D: 02/24/20 1029                           _____________________________________
T: 02/24/20 1618                           Wanda Angel               /nt

## 2020-05-18 ENCOUNTER — HOSPITAL ENCOUNTER (OUTPATIENT)
Dept: HOSPITAL 35 - PAIN | Age: 50
End: 2020-05-18
Attending: CLINICAL NURSE SPECIALIST
Payer: COMMERCIAL

## 2020-05-18 VITALS — HEIGHT: 70.98 IN | BODY MASS INDEX: 29.93 KG/M2 | WEIGHT: 213.8 LBS

## 2020-05-18 VITALS — DIASTOLIC BLOOD PRESSURE: 95 MMHG | SYSTOLIC BLOOD PRESSURE: 161 MMHG

## 2020-05-18 DIAGNOSIS — F11.20: ICD-10-CM

## 2020-05-18 DIAGNOSIS — M54.5: Primary | ICD-10-CM

## 2020-05-18 DIAGNOSIS — Z79.899: ICD-10-CM

## 2020-05-18 DIAGNOSIS — Z88.0: ICD-10-CM

## 2020-05-18 DIAGNOSIS — R07.89: ICD-10-CM

## 2020-05-18 DIAGNOSIS — M96.0: ICD-10-CM

## 2020-05-18 DIAGNOSIS — G89.22: ICD-10-CM

## 2020-05-18 DIAGNOSIS — G89.4: ICD-10-CM

## 2020-05-18 DIAGNOSIS — M19.90: ICD-10-CM

## 2020-05-18 NOTE — NUR
Pain Clinic Assessment:
 
1. History of Osteoarthritis:
DENIES
   History of Rheumatoid Arthritis:
DENIES
 
2. Height: 5 ft. 11 in. 180.3 cm.
   Weight: 213.8 lb.  oz. 96.979 kg.
   Patient's BMI: 29.8
 
3. Vital Signs:
   BP: 161/95 Pulse: 93 Resp: 14
   Temp:  02 Sat: 100 ECG Mon:
 
4. Pain Intensity: 6
 
5. Fall Risk:
   Dizziness: N  Needs help standing or walking: N
   Fallen in the last 3 months: N
   Fall risk comments:
 
 
6. Patient on Blood Thinner: None
 
7. History of Hypertension: N
 
8. Opioid Therapy greater than 6 weeks: Y
   Opiate Contract Signed: 08/04/16
 
9. Risk Assessment Tool Provided: LOW RISK 0/3
 
10. Functional Assessment Tool: 36/70
 
11. Recreational Drug Use: Never Drug Type:
    Tobacco Use: Never Smoker Tobacco Type: Chewing Tobacco
       Amount or Packs/day:  How Many Years:
    Alcohol Use: Yes  Frequency: Weekly Quant:

## 2020-05-18 NOTE — HPC
[facility]
[address]
[place]   [address]                     PAIN MANAGEMENT CONSULTATION  
_______________________________________________________________________________
 
Name:       DAVID MARTINEZ               Room #:                     REG [facility]..#:      [id number]                       Account #:      [id number]  
Admission:  05/18/20    Attend Phys:    Wanda Angel     
Discharge:              Date of Birth:  [birth year]  
                                                          Report #: [id number]
                                                                    [id number]   
_______________________________________________________________________________
THIS REPORT FOR:  
 
cc:  ANDER - No family physician/PCP 
     FAM - No family physician/PCP                                        
     Wanda Angel                                            ~
CC: Hunter Cummings MD
 
DATE OF SERVICE:  05/18/2020
 
 
CHIEF COMPLAINT:  Chronic low back pain, right chest wall pain.
 
HISTORY OF PRESENT ILLNESS:  This is a very pleasant gentleman who
suffers from chronic chest wall pain that radiates from his mid thoracic
region to his front on the right side.  Today, he is reporting his pain at a
6/10.  He notes it as an aching, throbbing occasional pressure and burning.  He
feels that when he has increased activity or weather changes, which has been
problematic in the past month, his pain has increased.  He reports that today is
a good day with his pain due he has been resting and has not started working 
and he also has taken his medicines, which he finds very beneficial in relieving
his pain with minimal side effects of constipation or daytime sleepiness.  He
states that he would like a refill today.
 
ALLERGIES:  PENICILLIN.
 
CURRENT LIST OF MEDICATIONS:  Exalgo 12 mg, ibuprofen p.r.n.
 
PQRS:
1.  The patient has diffuse osteoarthritis involving multiple joints.  Denies
any rheumatoid arthritis.
2.  Height is 5 feet 11 inches, weight is 213, BMI is 29.  Vital signs 161/95,
pulse is 93, respirations 14, oxygen sat is 100.
3.  Pain score 6/10.
4.  Denies dizziness, does not need help walking or standing, has not fallen in
the last 3 months.
5.  The patient is not on any blood thinners or medicine for hypertension,
though it is elevated today.
6.  Opiate therapy is greater than 6 weeks; therefore, an opioid signed contract
is on the chart.  Risk assessment is low.  Functional assessment is 36/70.
7.  Recreational drug use, he denies.  He is not a smoker, but he does chew
tobacco and he does occasionally drink alcohol.
 
According to the prescription monitoring system, the patient is due to fill his
medications this week.  He did have one early refill due to travel out of town
for work.  He has a random drug screen on his chart that is appropriate for his
medications from his last visit.  According to the [facility] guidelines, his morphine
 
 
 
[facility]
[address] [place]   [address]                     PAIN MANAGEMENT CONSULTATION  
_______________________________________________________________________________
 
Name:       DAVID MARTINEZIAN               Room #:                     REG DOROTHY VASQUEZ#:      [id number]                       Account #:      [id number]  
Admission:  05/18/20    Attend Phys:    Wanda Angel     
Discharge:              Date of Birth:  [birth year]  
                                                          Report #: [id number]
                                                                    [id number]   
_______________________________________________________________________________
milliequivalent is 48 MME's.
 
PHYSICAL EXAMINATION:
GENERAL:  This is alert and orientated, well-developed, well-nourished
49-year-old gentleman who appears his stated age.  He is a good historian,
placing his current pain score today at 6/10.
HEENT:  Normocephalic, atraumatic.  Extraocular eye muscles are intact.  He does
wear glasses and he is wearing a mask today.
MUSCULOSKELETAL:  Pain in his thoracic area that radiates around his right chest
wall following his ribs in the lateral aspect of his chest cavity with a
burning, throbbing pain.  He also has tenderness in his lumbosacral region with
no radicular symptoms.  Lower extremity strength judged to be 5/5 with all major
muscle groups.
 
IMPRESSION:
1.  Chronic low back pain with multiple pain generators, osteoarthritis.
2.  Post-laminectomy syndrome.
3.  Post-thoracotomy syndrome, status post pleurodesis.
4.  Osteoarthritis involving multiple joints.
5.  History of hemochromatosis with phlebotomies.
6.  Chronic intractable chest wall pain.
7.  Management of high risk medications under terms of written opioid agreement.
 
We reviewed the fact that opiate medications are being used to provide analgesia
adequate to support activities of daily living, not attempting to achieve a
specific pain score on the 0-10 Visual Analog Scale.  The current opiate
medications are providing sufficient analgesia to allow the patient to
participate in activities of daily living.  The patient is not exhibiting any
aberrant behavior suggestive of drug diversion.  The patient is not having any
adverse reactions to medications.  The patient is not suffering from daytime
somnolence or mental acuity changes.  The patient is managing opiate-induced
constipation with appropriate over-the-counter agents and dietary
considerations.  The patient was counseled on concern for caution with operating
a motor vehicle while using opiate medications.
 
PLAN:
1.  We discussed treatment options with the patient today.  The patient feels
his medications are very beneficial in controlling his pain.  It has been
elevated slightly due to some increased work and activity, but when he is able
to rest, his pain is very well controlled.
2.  We will have Dr. Hunter Cummings send electronically his hydromorphone
extended release 12 mg tablets, #30 for today, 4-week and 8-week.
3.  I encouraged the patient to find a primary care doctor.  His blood pressure
was slightly elevated again today.  The patient states he has been harder to
find somebody have a physical appointment due to the COVID virus, but he will
continue to try to find a primary care doctor.
 
 
 
[address]   [address]                     PAIN MANAGEMENT CONSULTATION  
_______________________________________________________________________________
 
Name:       DAVID MARTINEZ               Room #:                     REG [facility] 
PEDRO#:      [id number]                       Account #:      [id number]  
Admission:  05/18/20    Attend Phys:    Wanda Angel     
Discharge:              Date of Birth:  [birth year]  
                                                          Report #: [id number]
                                                                    [id number]   
_______________________________________________________________________________
4.  The patient is seen in collaboration with Dr. Hunter Cummings who the patient
did see as well today.
 
 
 
 
 
 
 
 
 
 
 
 
 
 
 
 
 
 
 
 
 
 
 
 
 
 
 
 
 
 
 
 
 
 
 
 
 
 
 
 
 
 
  <ELECTRONICALLY SIGNED>
   By: Wanda Angel             
  05/18/20     1457
D: 05/18/20 1004                           _____________________________________
T: 05/18/20 1149                           Wanda Angel               /nt

## 2020-08-10 ENCOUNTER — HOSPITAL ENCOUNTER (OUTPATIENT)
Dept: HOSPITAL 35 - PAIN | Age: 50
End: 2020-08-10
Attending: CLINICAL NURSE SPECIALIST
Payer: COMMERCIAL

## 2020-08-10 VITALS — WEIGHT: 211.6 LBS | BODY MASS INDEX: 29.62 KG/M2 | HEIGHT: 70.98 IN

## 2020-08-10 VITALS — DIASTOLIC BLOOD PRESSURE: 97 MMHG | SYSTOLIC BLOOD PRESSURE: 162 MMHG

## 2020-08-10 DIAGNOSIS — E83.119: ICD-10-CM

## 2020-08-10 DIAGNOSIS — G89.29: ICD-10-CM

## 2020-08-10 DIAGNOSIS — Z79.899: ICD-10-CM

## 2020-08-10 DIAGNOSIS — M19.90: ICD-10-CM

## 2020-08-10 DIAGNOSIS — M54.5: Primary | ICD-10-CM

## 2020-08-10 DIAGNOSIS — M54.2: ICD-10-CM

## 2020-08-10 DIAGNOSIS — F11.20: ICD-10-CM

## 2020-08-10 DIAGNOSIS — M96.1: ICD-10-CM

## 2020-08-10 DIAGNOSIS — Z88.0: ICD-10-CM

## 2020-08-10 NOTE — NUR
Pain Clinic Assessment:
 
1. History of Osteoarthritis:
NONE KNOWN
   History of Rheumatoid Arthritis:
DENIES
 
2. Height: 5 ft. 11 in. 180.3 cm.
   Weight: 211.6 lb.  oz. 95.981 kg.
   Patient's BMI: 29.5
 
3. Vital Signs:
   BP: 162/97 Pulse: 89 Resp: 20
   Temp:  02 Sat: 100 ECG Mon:
 
4. Pain Intensity: 4-5
 
5. Fall Risk:
   Dizziness: N  Needs help standing or walking: N
   Fallen in the last 3 months: N
   Fall risk comments:
 
 
6. Patient on Blood Thinner: None
 
7. History of Hypertension: N
 
8. Opioid Therapy greater than 6 weeks: Y
   Opiate Contract Signed: 08/04/16
 
9. Risk Assessment Tool Provided: LOW RISK 0/3
 
10. Functional Assessment Tool: 30/70
 
11. Recreational Drug Use: Never Drug Type:
    Tobacco Use: Never Smoker Tobacco Type: Chewing Tobacco
       Amount or Packs/day:  How Many Years:
    Alcohol Use: Yes  Frequency: Weekly Quant:

## 2020-08-11 NOTE — HPC
[facility]
[address]
[place]   [address]                     PAIN MANAGEMENT CONSULTATION  
_______________________________________________________________________________
 
Name:       DAVID MARTINEZ               Room #:                     [facility].#:      [id number]                       Account #:      [id number]  
Admission:  08/10/20    Attend Phys:    Wanda Angel     
Discharge:              Date of Birth:  [birth year]  
                                                          Report #: [id number]
                                                                    [id number]   
_______________________________________________________________________________
THIS REPORT FOR:  
 
cc:  ANDER - No family physician/PCP 
     FAM - No family physician/PCP                                        
     Wanda Angel                                            ~
CC: Hunter Cummings MD
 
DATE OF SERVICE:  08/10/2020
 
 
CHIEF COMPLAINT:  Chronic pain, cervicalgia, osteoarthritis and low back pain.
 
HISTORY OF PRESENT ILLNESS:  This is a pleasant 50-year-old longstanding patient
of Dr. Hunter Cummings who returns today for refill of his Exalgo medication that
he finds very beneficial in controlling his pain.  He continues to have pain in
his lower back as well as his right rib area from previous surgery.  It is an
aching, throbbing, burning pain that he rates as 4-5/10.  It is worse with too
much activity and weather changes.  He does report he had worked very
aggressively outside all day, Saturday in the heat.  He has been paying for that
for the next several days.  He reports finally feeling almost back to "normal"
from overexertion this weekend.  He denies problems with constipation or feeling
overmedicated.  Today, he is requesting refills.
 
ALLERGIES:  PENICILLIN.
 
CURRENT LIST OF MEDICATIONS:  Hydromorphone 12 mg daily and ibuprofen p.r.n.
 
PATIENT'S PQRS:
1.  He has diffuse osteoarthritis involving multiple joints and denies
rheumatoid arthritis.
2.  Height is 5 feet 11 inches, weight is 211, BMI is 29.  Vital signs; blood
pressure 162/97, pulse is 89, respirations 20, oxygen sat is 100, pain score is
4-5.
3.  Fall risk.  Denies dizziness, does not need help walking or standing, has
not fallen in the last 3 months.
4.  The patient is not on any blood thinners or medicine for hypertension.
5.  Opioid therapy is greater than 6 weeks; therefore, an opioid signed contract
is on the chart.  Risk assessment tool is low.  Functional assessment is 30/70.
6.  Recreational drug use, he denies.  He is not a smoker, but does chew tobacco
occasionally and occasionally drinks alcohol.
 
According to the prescription monitoring system, the patient is filling
appropriately in a timely fashion.  His morphine milliequivalent according to
the CDC guidelines is 48 MME.  There is a recent drug screen on the chart that
is appropriate as well.
 
 
 
 
[address]   [address]                     PAIN MANAGEMENT CONSULTATION  
_______________________________________________________________________________
 
Name:       DAVID MARTINEZ               Room #:                     [facility]#:      [id number]                       Account #:      [id number]  
Admission:  08/10/20    Attend Phys:    Wanda Angel     
Discharge:              Date of Birth:  [birth year]  
                                                          Report #: [id number]
                                                                    [id number]   
_______________________________________________________________________________
PHYSICAL EXAMINATION:
GENERAL:  This is alert and orientated, well-developed, well-nourished
50-year-old gentleman who appears his stated age, placing his current pain score
at 4-5 today.
HEENT:  Normocephalic, atraumatic.  Extraocular eye muscles are intact.  He is
wearing glasses and a mask.
MUSCULOSKELETAL:  He has tenderness in his lumbar region with no radicular
symptoms.  His lower extremity strength judged to be 5/5 in all major muscle
groups with good sensation as well.  He has pain in his thoracic area that
radiates from his back into the chest wall along his right ribs into the lateral
aspect of his chest.
 
IMPRESSION:
1.  Chronic low back pain with multiple pain generators and osteoarthritis.
2.  Post-laminectomy syndrome.
3.  Post-thoracotomy syndrome, status post pleurodesis.
4.  Osteoarthritis involving multiple joints.
5.  History of hemochromatosis with phlebotomies.
6.  Chronic intractable chest wall pain.
7.  Management of high risk medications under terms of written opioid agreement.
 
We reviewed the fact that opiate medications are being used to provide analgesia
adequate to support activities of daily living, not attempting to achieve a
specific pain score on the 0-10 Visual Analog Scale.  The current opiate
medications are providing sufficient analgesia to allow the patient to
participate in activities of daily living.  The patient is not exhibiting any
aberrant behavior suggestive of drug diversion.  The patient is not having any
adverse reactions to medications.  The patient is not suffering from daytime
somnolence or mental acuity changes.  The patient is managing opiate-induced
constipation with appropriate over-the-counter agents and dietary
considerations.  The patient was counseled on concern for caution with operating
a motor vehicle while using opiate medications.
 
PLAN:
1.  We discussed treatment options with the patient today.  Normally, his
medication controls his pain.  He did have a flare over the weekend from
exertion working outside on a chicken coop and moving his daughter into her
college dorm.  He feels that his pain is slowly subsiding back to its normal
level.  He does find his Exalgo very beneficial in controlling his discomfort. 
We will have Dr. Hunter Cummings send 3 months of medicine of hydromorphone ER 12
mg, #30 for his 3 months.
2.  The patient will return in 3 months' timeframe.  Encouraged the patient to
 
 
 
[facility]
[address] [facility] [place]   [address]                     PAIN MANAGEMENT CONSULTATION  
_______________________________________________________________________________
 
Name:       DAVID MARTINEZ               Room #:                     [facility] 
[facility]#:      [id number]                       Account #:      [id number]  
Admission:  08/10/20    Attend Phys:    Wanda Angel     
Discharge:              Date of Birth:  [birth year]  
                                                          Report #: [id number]
                                                                    [id number]   
_______________________________________________________________________________
call when he fills his last prescription.
3.  The patient is seen in collaboration with Dr. Hunter Cummings.
 
 
 
 
 
 
 
 
 
 
 
 
 
 
 
 
 
 
 
 
 
 
 
 
 
 
 
 
 
 
 
 
 
 
 
 
 
 
 
 
 
 
  <ELECTRONICALLY SIGNED>
   By: Wanda Angel             
  08/11/20     0818
D: 08/10/20 1447                           _____________________________________
T: 08/10/20 1648                           Wanda Angel               /nt

## 2020-11-02 ENCOUNTER — HOSPITAL ENCOUNTER (OUTPATIENT)
Dept: HOSPITAL 35 - PAIN | Age: 50
End: 2020-11-02
Attending: CLINICAL NURSE SPECIALIST
Payer: COMMERCIAL

## 2020-11-02 VITALS — DIASTOLIC BLOOD PRESSURE: 88 MMHG | SYSTOLIC BLOOD PRESSURE: 138 MMHG

## 2020-11-02 VITALS — BODY MASS INDEX: 30.18 KG/M2 | WEIGHT: 215.6 LBS | HEIGHT: 70.98 IN

## 2020-11-02 DIAGNOSIS — G89.29: ICD-10-CM

## 2020-11-02 DIAGNOSIS — M19.90: ICD-10-CM

## 2020-11-02 DIAGNOSIS — Z79.891: ICD-10-CM

## 2020-11-02 DIAGNOSIS — M96.1: Primary | ICD-10-CM

## 2020-11-02 DIAGNOSIS — I10: ICD-10-CM

## 2020-11-02 NOTE — NUR
Pain Clinic Assessment:
 
1. History of Osteoarthritis:
HANDS
   History of Rheumatoid Arthritis:
DENIES
 
2. Height: 5 ft. 11 in. 180.3 cm.
   Weight: 215.6 lb.  oz. 97.796 kg.
   Patient's BMI: 30.1
 
3. Vital Signs:
   BP: 138/88 Pulse: 90 Resp: 16
   Temp:  02 Sat: 97 ECG Mon:
 
4. Pain Intensity: 7
 
5. Fall Risk:
   Dizziness: N  Needs help standing or walking: N
   Fallen in the last 3 months: N
   Fall risk comments:
 
 
6. Patient on Blood Thinner: None
 
7. History of Hypertension: N
 
8. Opioid Therapy greater than 6 weeks: Y
   Opiate Contract Signed: 08/04/16
 
9. Risk Assessment Tool Provided: LOW RISK 0
 
10. Functional Assessment Tool: 30/70
 
11. Recreational Drug Use: Never Drug Type:
    Tobacco Use: Never Smoker Tobacco Type:
       Amount or Packs/day:  How Many Years:
    Alcohol Use: Yes  Frequency: Weekly Quant: 6-8 BEERS WEEKENDS

## 2020-11-03 NOTE — HPC
[facility]
[address]
[place]   [address]                     PAIN MANAGEMENT CONSULTATION  
_______________________________________________________________________________
 
Name:       DAVID MARTINEZ               Room #:                     [facility]..#:      [id number]                       Account #:      [id number]  
Admission:  11/02/20    Attend Phys:    Wanda Angel     
Discharge:              Date of Birth:  [birth year]  
                                                          Report #: [id number]
                                                                    [id number]   
_______________________________________________________________________________
CC: Wanda Cummings MD
 
DATE OF SERVICE:  11/02/2020
 
 
CHIEF COMPLAINT:  Chronic pain, cervicalgia mid and low back pain.
 
HISTORY OF PRESENT ILLNESS:  This is a very pleasant 50-year-old gentleman who
returns to the pain clinic today for refill of his opioid medications.  Today,
he is reporting a pain score of 4/10, mostly located in his right thoracic pain
that does radiate into his ribs.  He does state that he has ongoing low back
pain as well.  It is a constant throbbing pain, worse with activity and weather
changes, though he feels that his medications are working very well in
controlling his pain.  He denies any daytime somnolence or constipation issues
as a result of his medications.
 
The patient does report he has recently started on antihypertensive, Valsartan. 
He feels that since starting that medication he has been feeling better.  He is
scheduled to see a cardiologist just for a followup due to family history, but
is not experiencing any chest pain.
 
ALLERGIES:  PENICILLIN.
 
CURRENT LIST OF MEDICATIONS:  Exalgo 12 mg daily, Valsartan 160 mg daily and
ibuprofen p.r.n.
 
PQRS:
1.  He does have diffuse osteoarthritis involving multiple joints and denies any
rheumatoid arthritis.
2.  Height is 5 feet 11 inches, weight is 215, BMI is 30.
3.  Vital signs; blood pressure 138/88, pulse is 90, respirations 16, oxygen sat
is 97.
4.  Pain score is 7/10.
5.  Denies dizziness, does not need help walking or standing, has not fallen in
the last 3 months.  The patient does have history of hypertension.  He is not on
any blood thinners.
6.  Opioid therapy is greater than 6 weeks; therefore, an opioid signed contract
is on the chart.  Risk assessment tool is low.  Functional assessment is 30/70.
7.  Recreational drug use, he denies.  He is not a smoker and does drink
alcohol.
 
According to the prescription monitoring system, the patient is filling
appropriately.  He is due to fill this week.  His morphine milliequivalent is 48
MME according to the CDC guidelines.  We will have him renew his opioid contract
 
today.  He does have a urine drug screen on the chart that is appropriate for
his medications.
 
PHYSICAL EXAMINATION:
GENERAL:  This is alert and orientated 50-year-old gentleman who appears his
stated age, placing his current pain score 4/10.
HEENT:  Normocephalic, atraumatic.  Extraocular eye muscles are intact.  He is
wearing a mask.
MUSCULOSKELETAL:  He is able to move from sitting to standing position, walks
with a slightly stiff gait.  He has pain in his lower back from previous surgery
and pain radiates from his thoracic region to his chest wall on the right
lateral aspect.
 
IMPRESSION:
1.  Chronic low back pain with multiple pain generators.
2.  Osteoarthritis.
3.  Post-laminectomy syndrome.
4.  Post-thoracotomy syndrome, status post pleurodesis.
5.  History of hemochromatosis with phlebotomies.
6.  Chronic intractable chest wall pain.
7.  Management of high risk medications under terms of written opioid agreement.
8.  Hypertension.
 
We reviewed the fact that opiate medications are being used to provide analgesia
adequate to support activities of daily living, not attempting to achieve a
specific pain score on the 0-10 Visual Analog Scale.  The current opiate
medications are providing sufficient analgesia to allow the patient to
participate in activities of daily living.  The patient is not exhibiting any
aberrant behavior suggestive of drug diversion.  The patient is not having any
adverse reactions to medications.  The patient is not suffering from daytime
somnolence or mental acuity changes.  The patient is managing opiate-induced
constipation with appropriate over-the-counter agents and dietary
considerations.  The patient was counseled on concern for caution with operating
a motor vehicle while using opiate medications.
 
A physical exam was performed and the patient's functional status was evaluated.
 All patients with back pain were advised against the bed rest greater than 4
days and were advised to return to normal activities.  Pain score assessment was
noted and the treatment plan was reviewed with the patient.  All current
medications, both prescribed and OTC were reviewed and reconciled on the
electronic medical record.  Tobacco screening was accomplished and smoking
cessation was advised when indicated.  BMI was noted and diet/exercise
modification was recommended for all patients following outside normal
parameters.
 
I reviewed with the patient today their responsibilities to safeguard
prescription medications, reviewed their responsibility to utilize medications
only as prescribed by the physician.  They are to seek and receive pain
medications only from 1 physician group ( [facility]).  They are to use 1
pharmacy and keep the clinic informed if they change pharmacies.  Their
responsibilities include making followup visits in a timely fashion and to avoid
abrupt discontinuation of medication usage.  Their responsibilities further
include bringing their medications (bottles from the pharmacy with residual
pills) to the visit for possible confirmation of pill counts and the patient
understands it is their responsibility to submit to random drug screens to
ensure both that the medications prescribed are present, and that no other
controlled substances are present.  All prescriptions provided today were
generated electronically.
 
PLAN:
 
1.  We discussed treatment options with the patient today.  The patient finds
his hydromorphone 12 mg, very beneficial in controlling his pain.  We will
continue this medication and scripts will be sent electronically by Dr. Hunter Cummings for 3 months to his pharmacy.
2.  We did discuss Tanezumab.  Again, we are waiting for this injection to come
on the market.  The patient is interested in taking this medication for his
chronic pain instead of taking daily opioids.  We explained to him once we know
that is on the market, we will call and get him scheduled to have his first dose
we are unsure when that will be.
3.  The patient will return in 3 months.  The patient is seen today in
collaboration with Dr. Cummings.
 
 
 
 
 
 
 
 
 
 
 
 
 
 
 
 
 
 
 
 
 
 
 
 
 
 
 
 
 
 
 
 
 
 
 
 
 
 
 
 
 
 
 
 
 
 
 
  <ELECTRONICALLY SIGNED>
   By: Wanda Angel             
  11/03/20     0839
D: 11/02/20 1017                           _____________________________________
T: 11/02/20 1607                           Wanda Angel               /nt

## 2020-11-10 ENCOUNTER — HOSPITAL ENCOUNTER (OUTPATIENT)
Dept: HOSPITAL 35 - CAT | Age: 50
End: 2020-11-10
Attending: FAMILY MEDICINE
Payer: COMMERCIAL

## 2020-11-10 DIAGNOSIS — Z13.6: Primary | ICD-10-CM

## 2020-11-10 DIAGNOSIS — I25.10: ICD-10-CM

## 2020-11-10 DIAGNOSIS — E78.00: ICD-10-CM

## 2020-12-10 ENCOUNTER — HOSPITAL ENCOUNTER (INPATIENT)
Dept: HOSPITAL 35 - ER | Age: 50
LOS: 1 days | Discharge: HOME | DRG: 313 | End: 2020-12-11
Attending: FAMILY MEDICINE | Admitting: FAMILY MEDICINE
Payer: COMMERCIAL

## 2020-12-10 VITALS — DIASTOLIC BLOOD PRESSURE: 108 MMHG | SYSTOLIC BLOOD PRESSURE: 191 MMHG

## 2020-12-10 VITALS — WEIGHT: 211 LBS | BODY MASS INDEX: 41.43 KG/M2 | HEIGHT: 60 IN

## 2020-12-10 DIAGNOSIS — R07.89: Primary | ICD-10-CM

## 2020-12-10 DIAGNOSIS — M54.9: ICD-10-CM

## 2020-12-10 DIAGNOSIS — M54.6: ICD-10-CM

## 2020-12-10 DIAGNOSIS — Z60.2: ICD-10-CM

## 2020-12-10 DIAGNOSIS — Z88.0: ICD-10-CM

## 2020-12-10 DIAGNOSIS — I10: ICD-10-CM

## 2020-12-10 PROCEDURE — 10081 I&D PILONIDAL CYST COMP: CPT

## 2020-12-10 SDOH — SOCIAL STABILITY - SOCIAL INSECURITY: PROBLEMS RELATED TO LIVING ALONE: Z60.2

## 2020-12-11 VITALS — DIASTOLIC BLOOD PRESSURE: 78 MMHG | SYSTOLIC BLOOD PRESSURE: 127 MMHG

## 2020-12-11 VITALS — SYSTOLIC BLOOD PRESSURE: 131 MMHG | DIASTOLIC BLOOD PRESSURE: 70 MMHG

## 2020-12-11 VITALS — DIASTOLIC BLOOD PRESSURE: 97 MMHG | SYSTOLIC BLOOD PRESSURE: 140 MMHG

## 2020-12-11 LAB
ALBUMIN SERPL-MCNC: 4.1 G/DL (ref 3.4–5)
ALT SERPL-CCNC: 63 U/L (ref 30–65)
ANION GAP SERPL CALC-SCNC: 10 MMOL/L (ref 7–16)
APTT BLD: 29.9 SECONDS (ref 24.5–32.8)
AST SERPL-CCNC: 24 U/L (ref 15–37)
BASOPHILS NFR BLD AUTO: 0.7 % (ref 0–2)
BILIRUB SERPL-MCNC: 0.4 MG/DL (ref 0.2–1)
BUN SERPL-MCNC: 15 MG/DL (ref 7–18)
CALCIUM SERPL-MCNC: 9.2 MG/DL (ref 8.5–10.1)
CHLORIDE SERPL-SCNC: 102 MMOL/L (ref 98–107)
CO2 SERPL-SCNC: 27 MMOL/L (ref 21–32)
CREAT SERPL-MCNC: 1 MG/DL (ref 0.7–1.3)
EOSINOPHIL NFR BLD: 3.6 % (ref 0–3)
ERYTHROCYTE [DISTWIDTH] IN BLOOD BY AUTOMATED COUNT: 12.7 % (ref 10.5–14.5)
GLUCOSE SERPL-MCNC: 109 MG/DL (ref 74–106)
GRANULOCYTES NFR BLD MANUAL: 70.1 % (ref 36–66)
HCT VFR BLD CALC: 42.4 % (ref 42–52)
HGB BLD-MCNC: 14.5 GM/DL (ref 14–18)
INR PPP: 1.1
LYMPHOCYTES NFR BLD AUTO: 18 % (ref 24–44)
MAGNESIUM SERPL-MCNC: 2.2 MG/DL (ref 1.8–2.4)
MCH RBC QN AUTO: 30.2 PG (ref 26–34)
MCHC RBC AUTO-ENTMCNC: 34.2 G/DL (ref 28–37)
MCV RBC: 88.5 FL (ref 80–100)
MONOCYTES NFR BLD: 7.6 % (ref 1–8)
NEUTROPHILS # BLD: 5.3 THOU/UL (ref 1.4–8.2)
OPIATES UR-MCNC: POSITIVE NG/ML
PLATELET # BLD: 193 THOU/UL (ref 150–400)
POTASSIUM SERPL-SCNC: 3.8 MMOL/L (ref 3.5–5.1)
PROT SERPL-MCNC: 7.4 G/DL (ref 6.4–8.2)
PROTHROMBIN TIME: 11.2 SECONDS (ref 9.3–11.4)
RBC # BLD AUTO: 4.79 MIL/UL (ref 4.5–6)
SODIUM SERPL-SCNC: 139 MMOL/L (ref 136–145)
TROPONIN I SERPL-MCNC: <0.06 NG/ML (ref ?–0.06)
WBC # BLD AUTO: 7.5 THOU/UL (ref 4–11)

## 2020-12-11 NOTE — 2DMMODE
[facility]
[address] [address] [place]   [address]                   2 D/M-MODE ECHOCARDIOGRAM     
_______________________________________________________________________________
 
Name:       DAVID MARTINEZ               Room #:         200-I       ADM IN  
M.R.#:      [id number]                       Account #:      [id number]  
Admission:  20    Attend Phys:    Francisco Javier Booth MD  
Discharge:              Date of Birth:  [birth year]  
                                                          Report #: [id number]
                                                                    [id number]
_______________________________________________________________________________
THIS REPORT FOR:  
 
cc:  Francisco Javier Booth MD, Neal A. MD Lammoglia, Francisco J. MD                                        
                                                                       ~
 
--------------- APPROVED REPORT --------------
 
 
Study performed:  2020 10:20:41
 
EXAM: Comprehensive 2D, Doppler, and color-flow 
Echocardiogram 
Patient Location: Echo lab   
Room #:  200     Status:  routine
 
      BSA:         2.18
HR: 81 bpm BP:          140/97 mmHg 
Rhythm: NSR     
 
Other Information 
Study Quality: Good
 
Indications
Chest Pain
 
2D Dimensions
RVDd:  29.38 mm  
IVSd:  7.94 (7-11mm) LVOT Diam:  23.18 (18-24mm) 
LVDd:  48.99 mm  
PWd:  7.55 (7-11mm) Ascending Ao:  34.85 (22-36mm)
LVDs:  32.00 (25-40mm) 
Aortic Root:  34.64 mm IVC:  18.00 mm
 
Volumes
Left Atrial Volume (Systole) 
Single Plane 4CH:  27.87 mL Single Plane 2CH:  23.49 mL
    LA ESV Index:  16.00 mL/m2
 
Aortic Valve
AoV Peak John.:  1.50 m/s 
AO Peak Gr.:  9.00 mmHg  LVOT Max P.39 mmHg
    LVOT Max V:  1.16 m/s
TESS Vmax: 3.27 cm2  
 
 
 
[facility]
[address]
[place]  [address]
Phone:  [phone] 2 D/M-MODE ECHOCARDIOGRAM     
_______________________________________________________________________________
 
Name:            DAVID MARTINEZ               Room #:        200-I       [facility] [place]
[facility]#:           [id number]          Account #:     [id number]  
Admission:       20         Attend Phys:   Francisco Javier Booth, 
Discharge:                  Date of Birth: [birth year]  
                         Report #:      [id number]
        [id number]
_______________________________________________________________________________
Mitral Valve
    E/A Ratio:  1.1
    MV Decel. Time:  254.76 ms
MV E Max John.:  0.64 m/s 
MV A John.:  0.59 m/s  
MV PHT:  73.88 ms  
IVRT:  92.27 ms   
 
Pulmonary Valve
PV Peak John.:  1.30 m/s PV Peak Gr.:  6.77 mmHg
 
Pulmonary Vein
P Vein S:    0.37 m/s P Vein A:  0.26 m/s
P Vein D:   0.29 m/s P Vein A Dur.:  106.1 msec
P Vein S/D Ratio:  1.28 
 
Left Ventricle
The left ventricle is normal size. There is normal LV segmental wall 
motion. There is normal left ventricular wall thickness. Left 
ventricular systolic function is normal. The left ventricular 
ejection fraction is within the normal range. LVEF is 55-60%. The 
left ventricular diastolic function is normal.
 
Right Ventricle
The right ventricle is normal size. The right ventricular systolic 
function is normal.
 
Atria
The left atrium size is normal. The right atrium size is 
normal.
 
Aortic Valve
The aortic valve is normal in structure. No aortic regurgitation is 
present. There is no aortic valvular stenosis.
 
Mitral Valve
The mitral valve is normal in structure. There is no mitral valve 
regurgitation noted. No evidence of mitral valve stenosis.
 
Tricuspid Valve
The tricuspid valve is normal in structure. There is no tricuspid 
valve regurgitation noted.
 
Pulmonic Valve
The pulmonary valve is normal in structure. There is no pulmonic 
valvular regurgitation.
 
 
[facility]
[address]
[place]  [address]
Phone:  [phone]                    2 D/M-MODE ECHOCARDIOGRAM     
_______________________________________________________________________________
 
Name:            DAVID MARTINEZ               Room #:        200-I       [facility] [place]
..#:           [id number]          Account #:     [id number]  
Admission:       20         Attend Phys:   Francisco Javier Booth, 
Discharge:                  Date of Birth: [birth year]  
                         Report #:      [id number]
        [id number]
_______________________________________________________________________________
 
Great Vessels
The aortic root is normal in size. IVC is normal in size and 
collapses >50% with inspiration.
 
Pericardium
There is no pericardial effusion.
 
<Conclusion>
The left ventricle is normal size.
LVEF is 55-60%.
The aortic valve is normal in structure.
The mitral valve is normal in structure.
The tricuspid valve is normal in structure.
The pulmonary valve is normal in structure.
There is no pericardial effusion.
 
 
 
 
 
 
 
 
 
 
 
 
 
 
 
 
 
 
 
 
 
 
 
 
 
 
 
 
  <ELECTRONICALLY SIGNED>
   By: Anand Miranda MD    
  20     1103
D: 20                           _____________________________________
T: 20                           Anand Miranda MD      /INF

## 2020-12-11 NOTE — NUR
RECEIVED PT'S CARE AROUND 0735; PT. AOX4; WALKING AROUND THE ROOM; AOX4; C/O
PAIN OVER CHEST R AND L; EKG ORDERED BY NIGHT RN; DR. HUFF ROUNDING DURING
SHIFT CHANGED; PER DR. HUFF HOLD NITRO; CARDIO CONSULT; CT ORDERED; PT.
NOTIFIED; NPO DUE TO STRESS TEST; PT. NOTIFIED; AM MEDICATION GIVEN; STRESS
TEST CANCEL; ECHO; PT. NOTIFIED; DIET ORDERED; PAIN RE-ASSESSMENT PT. ST.
DECREASE PAIN; 3/10; SR ON THE MONITOR; PER DR. HUFF OK FOR PT. TO BE D/C
DURING THE AFTERNOON AFTER GIVEN TORADOL; PT. NOTIFIED; ASSESSMENT AS CHARGED;
FOLLOWING POC; WILL WORK ON D/C ORDERS;

## 2020-12-11 NOTE — EKG
[facility]
[address]
[place]   [address]                     ELECTROCARDIOGRAM REPORT      
_______________________________________________________________________________
 
Name:       DAVID MARTINEZ               Room #:         200-I       ADM IN  
M.R.#:      [id number]                       Account #:      [id number]  
Admission:  12/11/20    Attend Phys:    Francisco Javier Booth MD  
Discharge:              Date of Birth:  [birth year]  
                                                          Report #: [id number]
                                                                    [id number]
_______________________________________________________________________________
THIS REPORT FOR:  
 
cc:  Francisco Javier Booth MD, Neal A. MD Santiago, Patrick MD FACC                                         ~
 
 
 
 
 
 
 
 
 
 
 
 
 
 
 
 
 
 
 
 
 
 
 
 
 
 
 
 
 
 
 
 
 
 
 
 
 
 
 
  <ELECTRONICALLY SIGNED>
   By: Benji Kinsey MD, FACC    
  12/11/20 0733
D: 12/11/20 0731                           _____________________________________
T: 12/11/20 0731                           Benji Kinsey MD, FACC      /EPI

## 2020-12-11 NOTE — EKG
[facility]
[address]
[place]   [address]                     ELECTROCARDIOGRAM REPORT      
_______________________________________________________________________________
 
Name:       DAVID MARTINEZ               Room #:         200-I       ADM IN  
M.R.#:      [id number]                       Account #:      [id number]  
Admission:  12/11/20    Attend Phys:    Francisco Javier Booth MD  
Discharge:              Date of Birth:  [birth year]  
                                                          Report #: [id number]
                                                                    [id number]
_______________________________________________________________________________
THIS REPORT FOR:  
 
cc:  Francisco Javier Booth MD, Neal A. MD Santiago, Patrick MD FACC                                         ~
 
 
 
 
 
 
 
 
 
 
 
 
 
 
 
 
 
 
 
 
 
 
 
 
 
 
 
 
 
 
 
 
 
 
 
 
 
 
 
  <ELECTRONICALLY SIGNED>
   By: Benji Kinsey MD, FACC    
  12/11/20     0732
D: 12/10/20 2310                           _____________________________________
T: 12/10/20 2310                           Benji Kinsey MD, FACC      /EPI

## 2021-01-26 ENCOUNTER — HOSPITAL ENCOUNTER (OUTPATIENT)
Dept: HOSPITAL 35 - PAIN | Age: 51
End: 2021-01-26
Attending: CLINICAL NURSE SPECIALIST
Payer: COMMERCIAL

## 2021-01-26 VITALS — DIASTOLIC BLOOD PRESSURE: 96 MMHG | SYSTOLIC BLOOD PRESSURE: 136 MMHG

## 2021-01-26 VITALS — BODY MASS INDEX: 29.62 KG/M2 | HEIGHT: 70.98 IN | WEIGHT: 211.6 LBS

## 2021-01-26 DIAGNOSIS — M54.5: Primary | ICD-10-CM

## 2021-01-26 DIAGNOSIS — G89.29: ICD-10-CM

## 2021-01-26 DIAGNOSIS — M54.2: ICD-10-CM

## 2021-01-26 DIAGNOSIS — I10: ICD-10-CM

## 2021-01-26 DIAGNOSIS — M19.90: ICD-10-CM

## 2021-01-26 DIAGNOSIS — M54.9: ICD-10-CM

## 2021-01-26 DIAGNOSIS — F11.20: ICD-10-CM

## 2021-01-26 DIAGNOSIS — Z79.899: ICD-10-CM

## 2021-01-26 DIAGNOSIS — M96.1: ICD-10-CM

## 2021-01-26 DIAGNOSIS — Z88.8: ICD-10-CM

## 2021-01-26 NOTE — NUR
Pain Clinic Assessment:
 
1. History of Osteoarthritis:
HANDS
   History of Rheumatoid Arthritis:
DENIES
 
2. Height: 5 ft. 11 in. 180.3 cm.
   Weight: 211.6 lb.  oz. 95.981 kg.
   Patient's BMI: 29.5
 
3. Vital Signs:
   BP: 136/96 Pulse: 81 Resp: 14
   Temp:  02 Sat: 97 ECG Mon:
 
4. Pain Intensity: 3
 
5. Fall Risk:
   Dizziness: N  Needs help standing or walking: N
   Fallen in the last 3 months: N
   Fall risk comments:
 
 
6. Patient on Blood Thinner: None
 
7. History of Hypertension: Y
 
8. Opioid Therapy greater than 6 weeks: Y
   Opiate Contract Signed: 11/03/20
 
9. Risk Assessment Tool Provided: LOW RISK 0
 
10. Functional Assessment Tool: 30/70
 
11. Recreational Drug Use: Past greater than 3 mos Drug Type:
    Tobacco Use: Never Smoker Tobacco Type:
       Amount or Packs/day:  How Many Years:
    Alcohol Use: Yes  Frequency:  Quant:

## 2021-01-27 NOTE — HPC
[facility]
[address]
[place]   [address]                     PAIN MANAGEMENT CONSULTATION  
_______________________________________________________________________________
 
Name:       DAVID MARTINEZ               Room #:                     [facility].#:      [id number]                       Account #:      [id number]  
Admission:  01/26/21    Attend Phys:    Wanda Angel     
Discharge:              Date of Birth:  [birth year]  
                                                          Report #: [id number]
                                                                    [id number]   
_______________________________________________________________________________
THIS REPORT FOR:  
 
cc:  Francisco Javier Booth MD, Neal A. MD Hocker,Wanda ZAVALA                                            ~
DATE OF SERVICE:  01/26/2021
 
 
CHIEF COMPLAINT:  Chronic pain, cervicalgia and low back pain, mid thoracic
pain. 
 
HISTORY OF PRESENT ILLNESS:  This is a very pleasant 50-year-old gentleman who
returns to the pain clinic today for refill of his opioid medications.  Today,
the patient is reporting a pain score of 3/10, most centrally located in his
right rib cage and right thoracic back, though he does occasionally have low
back pain as well.  He describes it as a constant throbbing pain that is worse
with any increase in significant activity or weather changes.  Overall, he
places his pain score as 3/10 with his medication of Exalgo on a daily basis.  
 
The patient reports he was recently hospitalized in December, he was having some
chest pain, myofascial pain in his chest, it was ruled out any cardiac issues
and no blood clots were found, this increase in pain lasted about 48 hours per
his report and then did slowly decrease.  Unfortunately, when he was in the
hospital, he had not had his pain medication for over 24 hours and was starting
to go through withdrawal.  Then, he was given a dose of OxyContin since
hydromorphone is not on formulary at the hospital.  The patient reports he is
going to follow up with a cardiologist, but he has had no further issues since
he was discharged. 
 
ALLERGIES:  PENICILLIN. 
 
CURRENT LIST OF MEDICATIONS:  Exalgo 12 mg daily, valsartan 160 mg daily and
ibuprofen p.r.n. 
 
PQRS: 
1.  He has osteoarthritic changes that are diffuse affecting multiple joints and
denies any rheumatoid arthritis. 
2.  Height is 5 feet 11 inches, weight is 211, BMI is 29. 
3.  Vital signs 136/96, pulse is 81, respirations 14, and oxygen sat is 97%. 
4.  Pain score is 3/10. 
5.  Denies dizziness, does not need help walking or standing, has not fallen in
the last 3 months. 
6.  The patient is not on any blood thinners, but does take medicine for
hypertension.  His opioid therapy is greater than 6 weeks; therefore, an opioid
signed contract is on the chart.  Risk assessment is low.  Functional assessment
is 30/70. 
 
 
 
[address]   [address]                     PAIN MANAGEMENT CONSULTATION  
_______________________________________________________________________________
 
Name:       DAVID MARTINEZ               Room #:                     [facility].#:      [id number]                       Account #:      [id number]  
Admission:  01/26/21    Attend Phys:    Wanda Angel     
Discharge:              Date of Birth:  [birth year]  
                                                          Report #: [id number]
                                                                    [id number]   
_______________________________________________________________________________
7.  Recreational drug use in the past.  He is not a smoker and occasionally
drinks alcohol. 
 
According to the prescription monitoring system, he is due to fill his
medications, filling them in a timely fashion.  According to the CDC guidelines,
his morphine milliequivalent is 48 MMEs per day.  There is a drug screen on his
chart that is appropriate for his medications. 
 
PHYSICAL EXAMINATION: 
GENERAL:  This is alert and orientated, pleasant 50-year-old gentleman who
appears his stated age, placing his current pain score today at 3/10.  He is a
good historian. 
HEENT:  Normocephalic, atraumatic.  Extraocular eye muscles are intact.  He is
wearing a mask. 
MUSCULOSKELETAL:  Pain is in his mid thoracic region that radiates around his
chest wall following towards the right lateral aspect along his incisional pain.
 He does have ongoing discomfort in his lumbar spine as well.  He has a slightly
antalgic gait, but moves without difficulty from the seated to standing
position. 
 
IMPRESSION: 
1.  Chronic low back pain with multiple pain generators. 
2.  Osteoarthritis. 
3.  Post-laminectomy syndrome. 
4.  Post-thoracotomy syndrome, status post pleurodesis. 
5.  History of chromatosis with phlebotomies. 
6.  Chronic intractable chest wall pain. 
7.  Hypertension. 
8.  Management of high risk medications under terms of written opioid agreement.
 
 
PLAN: 
1.  We discussed treatment options with the patient today.  The patient finds
his medication very beneficial in controlling his pain and would like to
continue this medication.  Scripts will be sent electronically by Dr. Cummings for
12 mg tablets, #30, for today and 4-week supply and 8 weeks. 
2.  We did discuss the COVID vaccine.  The patient is hopeful to have this
inoculation soon.  He is on the list through [place], but is also looking
into trials of this medication.  The patient is hopeful to be vaccinated before
our next visit. 
3.  The patient is seen today in collaboration with Dr. Hunter Cummings.
 
 
 
  <ELECTRONICALLY SIGNED>
   By: Wanda Angel             
  01/27/21     [id number]
D: 01/26/21 1112                           _____________________________________
T: 01/26/21 1213                           Wanda Angel               /nt

## 2021-04-19 ENCOUNTER — HOSPITAL ENCOUNTER (OUTPATIENT)
Dept: HOSPITAL 35 - PAIN | Age: 51
End: 2021-04-19
Attending: CLINICAL NURSE SPECIALIST
Payer: COMMERCIAL

## 2021-04-19 VITALS — HEIGHT: 70.98 IN | BODY MASS INDEX: 29.18 KG/M2 | WEIGHT: 208.4 LBS

## 2021-04-19 VITALS — DIASTOLIC BLOOD PRESSURE: 91 MMHG | SYSTOLIC BLOOD PRESSURE: 125 MMHG

## 2021-04-19 DIAGNOSIS — Z79.899: ICD-10-CM

## 2021-04-19 DIAGNOSIS — Z79.891: ICD-10-CM

## 2021-04-19 DIAGNOSIS — Z72.89: ICD-10-CM

## 2021-04-19 DIAGNOSIS — M54.5: Primary | ICD-10-CM

## 2021-04-19 DIAGNOSIS — I10: ICD-10-CM

## 2021-04-19 DIAGNOSIS — M19.90: ICD-10-CM

## 2021-04-19 DIAGNOSIS — G89.29: ICD-10-CM

## 2021-04-19 NOTE — NUR
Pain Clinic Assessment:
 
1. History of Osteoarthritis:
HANDS
   History of Rheumatoid Arthritis:
DENIES
 
2. Height: 5 ft. 11 in. 180.3 cm.
   Weight: 208.4 lb.  oz. 94.530 kg.
   Patient's BMI: 29.1
 
3. Vital Signs:
   BP: 125/91 Pulse: 94 Resp: 14
   Temp:  02 Sat: 100 ECG Mon:
 
4. Pain Intensity: 5
 
5. Fall Risk:
   Dizziness: N  Needs help standing or walking: N
   Fallen in the last 3 months: N
   Fall risk comments:
 
 
6. Patient on Blood Thinner: None
 
7. History of Hypertension: Y
 
8. Opioid Therapy greater than 6 weeks: Y
   Opiate Contract Signed: 11/03/20
 
9. Risk Assessment Tool Provided: LOW RISK 0
 
10. Functional Assessment Tool: 30/70
 
11. Recreational Drug Use: Past greater than 3 mos Drug Type:
    Tobacco Use: Never Smoker Tobacco Type:
       Amount or Packs/day:  How Many Years:
    Alcohol Use: Yes  Frequency:  Quant:

## 2021-07-12 ENCOUNTER — HOSPITAL ENCOUNTER (OUTPATIENT)
Dept: HOSPITAL 35 - PAIN | Age: 51
End: 2021-07-12
Attending: ANESTHESIOLOGY
Payer: COMMERCIAL

## 2021-07-12 VITALS — SYSTOLIC BLOOD PRESSURE: 141 MMHG | DIASTOLIC BLOOD PRESSURE: 84 MMHG

## 2021-07-12 VITALS — WEIGHT: 212.6 LBS | HEIGHT: 70.98 IN | BODY MASS INDEX: 29.76 KG/M2

## 2021-07-12 DIAGNOSIS — Z72.89: ICD-10-CM

## 2021-07-12 DIAGNOSIS — G89.4: Primary | ICD-10-CM

## 2021-07-12 DIAGNOSIS — Z79.899: ICD-10-CM

## 2021-07-12 DIAGNOSIS — I10: ICD-10-CM

## 2021-07-12 DIAGNOSIS — Z88.0: ICD-10-CM

## 2021-07-12 DIAGNOSIS — Z79.891: ICD-10-CM

## 2021-07-12 NOTE — NUR
Pain Clinic Assessment:
 
1. History of Osteoarthritis:
HANDS
DIFFUSE AFFECTING
MULTIPLE JOINTS
   History of Rheumatoid Arthritis:
DENIES
 
2. Height: 5 ft. 11 in. 180.3 cm.
   Weight: 212.6 lb.  oz. 96.435 kg.
   Patient's BMI: 29.7
 
3. Vital Signs:
   BP: 141/84 Pulse: 85 Resp: 16
   Temp:  02 Sat: 98 ECG Mon:
 
4. Pain Intensity: 6
 
5. Fall Risk:
   Dizziness: N  Needs help standing or walking: N
   Fallen in the last 3 months: N
   Fall risk comments:
 
 
6. Patient on Blood Thinner: None
 
7. History of Hypertension: Y
 
8. Opioid Therapy greater than 6 weeks: Y
   Opiate Contract Signed: 11/03/20
 
9. Risk Assessment Tool Provided: LOW RISK 0
 
10. Functional Assessment Tool: 30/70
 
11. Recreational Drug Use: Past greater than 3 mos Drug Type:
    Tobacco Use: Never Smoker Tobacco Type:
       Amount or Packs/day:  How Many Years:
    Alcohol Use: Yes  Frequency: Weekly Quant: 2-4

## 2021-08-25 ENCOUNTER — HOSPITAL ENCOUNTER (OUTPATIENT)
Dept: HOSPITAL 35 - CAT | Age: 51
End: 2021-08-25
Attending: NURSE PRACTITIONER
Payer: COMMERCIAL

## 2021-08-25 DIAGNOSIS — J98.4: ICD-10-CM

## 2021-08-25 DIAGNOSIS — K40.90: Primary | ICD-10-CM

## 2021-08-25 DIAGNOSIS — R10.84: ICD-10-CM

## 2021-08-25 DIAGNOSIS — J84.10: ICD-10-CM

## 2021-09-10 ENCOUNTER — HOSPITAL ENCOUNTER (OUTPATIENT)
Dept: HOSPITAL 35 - ULTRA | Age: 51
End: 2021-09-10
Attending: FAMILY MEDICINE
Payer: COMMERCIAL

## 2021-09-10 DIAGNOSIS — R10.84: ICD-10-CM

## 2021-09-10 DIAGNOSIS — K76.0: Primary | ICD-10-CM

## 2021-10-07 ENCOUNTER — HOSPITAL ENCOUNTER (OUTPATIENT)
Dept: HOSPITAL 35 - PAIN | Age: 51
End: 2021-10-07
Attending: CLINICAL NURSE SPECIALIST
Payer: COMMERCIAL

## 2021-10-07 VITALS — SYSTOLIC BLOOD PRESSURE: 141 MMHG | DIASTOLIC BLOOD PRESSURE: 96 MMHG

## 2021-10-07 VITALS — HEIGHT: 70.98 IN | BODY MASS INDEX: 29.68 KG/M2 | WEIGHT: 212 LBS

## 2021-10-07 DIAGNOSIS — Z79.891: ICD-10-CM

## 2021-10-07 DIAGNOSIS — Z79.899: ICD-10-CM

## 2021-10-07 DIAGNOSIS — G89.4: Primary | ICD-10-CM

## 2021-10-07 DIAGNOSIS — M19.90: ICD-10-CM

## 2021-10-07 NOTE — NUR
Pain Clinic Assessment:
 
1. History of Osteoarthritis:
HANDS
DIFFUSE AFFECTING
MULTIPLE JOINTS
   History of Rheumatoid Arthritis:
DENIES
 
2. Height: 5 ft. 11 in. 180.3 cm.
   Weight: 212.0 lb.  oz. 96.163 kg.
   Patient's BMI: 29.6
 
3. Vital Signs:
   BP: 141/96 Pulse: 68 Resp: 16
   Temp:  02 Sat: 98 ECG Mon:
 
4. Pain Intensity: 5
 
5. Fall Risk:
   Dizziness: N  Needs help standing or walking: N
   Fallen in the last 3 months: N
   Fall risk comments:
 
 
6. Patient on Blood Thinner: None
 
7. History of Hypertension: Y
 
8. Opioid Therapy greater than 6 weeks: Y
   Opiate Contract Signed: 11/03/20
 
9. Risk Assessment Tool Provided: LOW RISK 0
 
10. Functional Assessment Tool: 30/70
 
11. Recreational Drug Use: Past greater than 3 mos Drug Type:
    Tobacco Use: Never Smoker Tobacco Type:
       Amount or Packs/day:  How Many Years:
    Alcohol Use: Yes  Frequency:  Quant:

## 2021-12-30 ENCOUNTER — HOSPITAL ENCOUNTER (OUTPATIENT)
Dept: HOSPITAL 35 - PAIN | Age: 51
End: 2021-12-30
Attending: CLINICAL NURSE SPECIALIST
Payer: COMMERCIAL

## 2021-12-30 VITALS — BODY MASS INDEX: 29.99 KG/M2 | HEIGHT: 70.98 IN | WEIGHT: 214.2 LBS

## 2021-12-30 VITALS — SYSTOLIC BLOOD PRESSURE: 136 MMHG | DIASTOLIC BLOOD PRESSURE: 90 MMHG

## 2021-12-30 DIAGNOSIS — E83.118: ICD-10-CM

## 2021-12-30 DIAGNOSIS — M19.90: ICD-10-CM

## 2021-12-30 DIAGNOSIS — G89.29: Primary | ICD-10-CM

## 2021-12-30 DIAGNOSIS — M96.1: ICD-10-CM

## 2021-12-30 DIAGNOSIS — R07.89: ICD-10-CM

## 2021-12-30 DIAGNOSIS — Z88.0: ICD-10-CM

## 2021-12-30 DIAGNOSIS — Z79.899: ICD-10-CM

## 2021-12-30 NOTE — NUR
Pain Clinic Assessment:
 
1. History of Osteoarthritis:
HANDS
DIFFUSE AFFECTING
MULTIPLE JOINTS
   History of Rheumatoid Arthritis:
DENIES
 
2. Height: 5 ft. 11 in. 180.3 cm.
   Weight: 214.2 lb.  oz. 97.161 kg.
   Patient's BMI: 29.9
 
3. Vital Signs:
   BP: 136/90 Pulse: 81 Resp: 16
   Temp:  02 Sat: 100 ECG Mon:
 
4. Pain Intensity: 5
 
5. Fall Risk:
   Dizziness: N  Needs help standing or walking: N
   Fallen in the last 3 months: N
   Fall risk comments:
 
 
6. Patient on Blood Thinner: None
 
7. History of Hypertension: Y
 
8. Opioid Therapy greater than 6 weeks: Y
   Opiate Contract Signed: 11/03/20
 
9. Risk Assessment Tool Provided: LOW RISK 0
 
10. Functional Assessment Tool: 30/70
 
11. Recreational Drug Use: Past greater than 3 mos Drug Type:
    Tobacco Use: Never Smoker Tobacco Type:
       Amount or Packs/day:  How Many Years:
    Alcohol Use: Yes  Frequency:  Quant: